# Patient Record
Sex: FEMALE | URBAN - METROPOLITAN AREA
[De-identification: names, ages, dates, MRNs, and addresses within clinical notes are randomized per-mention and may not be internally consistent; named-entity substitution may affect disease eponyms.]

---

## 2017-09-14 ENCOUNTER — APPOINTMENT (OUTPATIENT)
Age: 27
Setting detail: DERMATOLOGY
End: 2017-09-14

## 2017-09-14 VITALS — WEIGHT: 230 LBS | HEIGHT: 64 IN

## 2017-09-14 DIAGNOSIS — L57.8 OTHER SKIN CHANGES DUE TO CHRONIC EXPOSURE TO NONIONIZING RADIATION: ICD-10-CM

## 2017-09-14 DIAGNOSIS — L30.9 DERMATITIS, UNSPECIFIED: ICD-10-CM

## 2017-09-14 DIAGNOSIS — D18.0 HEMANGIOMA: ICD-10-CM

## 2017-09-14 DIAGNOSIS — D22 MELANOCYTIC NEVI: ICD-10-CM

## 2017-09-14 PROBLEM — D18.01 HEMANGIOMA OF SKIN AND SUBCUTANEOUS TISSUE: Status: ACTIVE | Noted: 2017-09-14

## 2017-09-14 PROBLEM — L29.8 OTHER PRURITUS: Status: ACTIVE | Noted: 2017-09-14

## 2017-09-14 PROBLEM — D22.62 MELANOCYTIC NEVI OF LEFT UPPER LIMB, INCLUDING SHOULDER: Status: ACTIVE | Noted: 2017-09-14

## 2017-09-14 PROBLEM — D22.5 MELANOCYTIC NEVI OF TRUNK: Status: ACTIVE | Noted: 2017-09-14

## 2017-09-14 PROBLEM — D22.61 MELANOCYTIC NEVI OF RIGHT UPPER LIMB, INCLUDING SHOULDER: Status: ACTIVE | Noted: 2017-09-14

## 2017-09-14 PROBLEM — L20.84 INTRINSIC (ALLERGIC) ECZEMA: Status: ACTIVE | Noted: 2017-09-14

## 2017-09-14 PROCEDURE — OTHER REASSURANCE: OTHER

## 2017-09-14 PROCEDURE — OTHER COUNSELING: OTHER

## 2017-09-14 PROCEDURE — OTHER FOLLOW UP FOR NEXT VISIT: OTHER

## 2017-09-14 PROCEDURE — OTHER TREATMENT REGIMEN: OTHER

## 2017-09-14 PROCEDURE — 99203 OFFICE O/P NEW LOW 30 MIN: CPT

## 2017-09-14 PROCEDURE — OTHER MIPS QUALITY: OTHER

## 2017-09-14 ASSESSMENT — LOCATION ZONE DERM
LOCATION ZONE: LEG
LOCATION ZONE: FACE
LOCATION ZONE: ARM
LOCATION ZONE: TRUNK

## 2017-09-14 ASSESSMENT — LOCATION DETAILED DESCRIPTION DERM
LOCATION DETAILED: LEFT CENTRAL MALAR CHEEK
LOCATION DETAILED: LEFT ANTERIOR PROXIMAL UPPER ARM
LOCATION DETAILED: SUPERIOR THORACIC SPINE
LOCATION DETAILED: LEFT PROXIMAL DORSAL FOREARM
LOCATION DETAILED: PERIUMBILICAL SKIN
LOCATION DETAILED: LEFT DISTAL DORSAL FOREARM
LOCATION DETAILED: LEFT SUPERIOR FOREHEAD
LOCATION DETAILED: RIGHT DISTAL DORSAL FOREARM
LOCATION DETAILED: LEFT MEDIAL SUPERIOR CHEST
LOCATION DETAILED: RIGHT PROXIMAL RADIAL DORSAL FOREARM
LOCATION DETAILED: LEFT ANTERIOR PROXIMAL THIGH
LOCATION DETAILED: RIGHT MEDIAL UPPER BACK

## 2017-09-14 ASSESSMENT — BSA RASH: BSA RASH: 2

## 2017-09-14 ASSESSMENT — LOCATION SIMPLE DESCRIPTION DERM
LOCATION SIMPLE: RIGHT UPPER BACK
LOCATION SIMPLE: LEFT FOREHEAD
LOCATION SIMPLE: RIGHT FOREARM
LOCATION SIMPLE: LEFT UPPER ARM
LOCATION SIMPLE: LEFT FOREARM
LOCATION SIMPLE: LEFT THIGH
LOCATION SIMPLE: ABDOMEN
LOCATION SIMPLE: CHEST
LOCATION SIMPLE: LEFT CHEEK
LOCATION SIMPLE: UPPER BACK

## 2017-09-14 ASSESSMENT — PAIN INTENSITY VAS: HOW INTENSE IS YOUR PAIN 0 BEING NO PAIN, 10 BEING THE MOST SEVERE PAIN POSSIBLE?: NO PAIN

## 2017-09-14 ASSESSMENT — SEVERITY ASSESSMENT: SEVERITY: MILD

## 2017-09-14 NOTE — PROCEDURE: TREATMENT REGIMEN
Samples Given: Halog 0.1% cm BID X 1-2 weeks
Notification: Please note that there are new Treatment Regimen plans which have an enhanced patient education handout experience.  The plans are called Treatment Regimen (Acne), Treatment Regimen (Atopic Dermatitis), Treatment Regimen (Rosacea), Treatment Regimen (Sun Protection), Treatment Regimen (Cosmetic Products), and Treatment Regimen (Xerosis).
Plan: This appears to be related to some form of eczema or hypersensitivity reaction. The topical steroid should make this go away. Patient will call if this is not better in two weeks or if this worsens. There is a possibility of a fungal infection although based on her history this seems unlikely. If this improves as it should patient can follow up annually for skin exams
Detail Level: Simple

## 2017-09-14 NOTE — PROCEDURE: REASSURANCE
Detail Level: Zone
Include Location In Plan?: Yes
Additional Note: Normal exam, no suspicious lesions, no abnormal moles. Follow up annually
Additional Note: Normal exam, no evidence of any pre-cancers or skin cancers. Continue regular sunscreen use. Follow up annually
Additional Note: Patient reassured these are benign, no treatment needed. Treatment would be cosmetic

## 2017-09-14 NOTE — PROCEDURE: MIPS QUALITY
Detail Level: Detailed
Quality 110: Preventive Care And Screening: Influenza Immunization: Influenza Immunization Ordered or Recommended, but not Administered due to system reason
Quality 111:Pneumonia Vaccination Status For Older Adults: Pneumococcal Vaccination not Administered or Previously Received, Reason not Otherwise Specified
Quality 47: Advance Care Plan: Advance care planning not documented, reason not otherwise specified.